# Patient Record
Sex: MALE | Race: BLACK OR AFRICAN AMERICAN | Employment: UNEMPLOYED | URBAN - METROPOLITAN AREA
[De-identification: names, ages, dates, MRNs, and addresses within clinical notes are randomized per-mention and may not be internally consistent; named-entity substitution may affect disease eponyms.]

---

## 2020-04-23 ENCOUNTER — HOSPITAL ENCOUNTER (EMERGENCY)
Age: 40
Discharge: HOME | End: 2020-04-23
Attending: EMERGENCY MEDICINE
Payer: MEDICAID

## 2020-04-23 VITALS
OXYGEN SATURATION: 100 % | TEMPERATURE: 97.6 F | HEART RATE: 61 BPM | RESPIRATION RATE: 16 BRPM | DIASTOLIC BLOOD PRESSURE: 53 MMHG | SYSTOLIC BLOOD PRESSURE: 105 MMHG

## 2020-04-23 DIAGNOSIS — F41.1 ANXIETY STATE: ICD-10-CM

## 2020-04-23 DIAGNOSIS — R44.0 HEARING VOICES: Primary | ICD-10-CM

## 2020-04-23 PROCEDURE — 90791 PSYCH DIAGNOSTIC EVALUATION: CPT

## 2020-04-23 PROCEDURE — 99282 EMERGENCY DEPT VISIT SF MDM: CPT

## 2020-04-23 NOTE — ED NOTES
Patient given discharge instructions and follow up information.  Pt ambulatory out of ER with steady gait

## 2020-04-23 NOTE — BSMART NOTE
Comprehensive Assessment Form Part 1 Section I - Disposition Axis I - Bipolar (per pt report) Axis II - deferred Axis III - History reviewed. No pertinent past medical history. Axis IV - socioeconomic, homeless, lack of support The Medical Doctor to Psychiatrist conference was not completed. The Medical Doctor is in agreement with Psychiatrist disposition because of patient not meeting inpatient criteria. The plan is discharge with outpatient resources. The on-call Psychiatrist consulted was Dr. Tano Pineda. The admitting Psychiatrist will be Dr. Tano Pineda. The admitting Diagnosis is N/A. The Payor source is self pay. Section II - Integrated Summary Summary:  Patient arrived to ER with chief complain being per triage: \"Pt c/o anxiety, denies HI, denies SI, denies cough, sob or fever. \"  Patient denied suicidal ideation and homicidal ideation. Patient did not share he was feeling anxious but did reveal he is hearing voices and needed a new prescription for his Risperdal. Patient denied voices being command in nature. Patient shared he is from [place] and has stopped in [place] for two weeks on his journey to [place]. Patient shared he used to live in [place] and he decided to go back there to reside. Patient reported his sleep and appetite are \"okay. \" Patient reported no source of income but stated he would like to get on disability. Patient reported he is in need to get his Risperdal prescription refilled. Patient also reported his last hospitalization was in [place] back in 2010. Patient was poor historian. Patient provided writer with step-fathers name/contact telephone number, Jonathan/[phone]. Writer called and Antonia Bustillo shared patient was on his way to [place] and they did not know he was still here in [place]. Antonia Bustillo was concerned patient may have Covid 23 and writer shared patient did not report those symptoms.  Writer shared disposition plan with Antonia Bustillo and he was appreciative of the referral resources for patient. Patient at this time does not meet criteria for inpatient or CSU admission. Patient provided with Covid 19 Call sheet for shelter and food resources. Patient also provided with [facility] information as well as [facility] and other [facility] clinics. The patienthas demonstrated mental capacity to provide informed consent. The information is given by the patient. The Chief Complaint is anxiety & medication refill. The Precipitant Factors are homelessness, lack of structure & lack of resources. Previous Hospitalizations: yes The patient has not previously been in restraints. Current Psychiatrist and/or  is no one. Lethality Assessment: 
 
The potential for suicide is not noted. The potential for homicide is not noted. The patient has not been a perpetrator of sexual or physical abuse. There are not pending charges. The patient is not felt to be at risk for self harm or harm to others. The attending nurse was advised that security has not been notified. Section III - Psychosocial 
The patient's overall mood and attitude is sleepy. Feelings of helplessness and hopelessness are not observed. Generalized anxiety is not observed. Panic is not observed. Phobias are not observed. Obsessive compulsive tendencies are not observed. Section IV - Mental Status Exam 
The patient's appearance shows poor hygiene. The patient's behavior shows no evidence of impairment. The patient is oriented to time, place, person and situation. The patient's speech is soft. The patient's mood is withdrawn. The range of affect is flat. The patient's thought content demonstrates no evidence of impairment. The thought process shows no evidence of impairment. The patient's perception shows evidence of auditory hallucination impairment.  The patient's memory shows no evidence of impairment. The patient's appetite shows no evidence of impairment. The patient's sleep shows no evidence of impairment. The patient's insight shows no evidence of impairment. The patient's judgement shows no evidence of impairment. Section V - Substance Abuse The patient is not using substances. Section VI - Living Arrangements The patient is single. The patient lives alone. The patient has no children. The patient does plan to return home upon discharge. The patient does not have legal issues pending. The patient's source of income comes from no one. Sabianism and cultural practices have not been voiced at this time. The patient's greatest support comes from no one and this person will not be involved with the treatment. The patient has not been in an event described as horrible or outside the realm of ordinary life experience either currently or in the past. 
The patient has not been a victim of sexual/physical abuse. Section VII - Other Areas of Clinical Concern The highest grade achieved is some college with the overall quality of school experience being described as unkown. The patient is currently unemployed and speaks [place] as a primary language. The patient has no communication impairments affecting communication. The patient's preference for learning can be described as: can read and write adequately. The patient's hearing is normal.  The patient's vision is normal. 
 
 
Chantel Sun M.S., Resident In Counseling

## 2020-04-23 NOTE — ED NOTES
Spoke with Zulema from [facility] she is working with [facility] to find placement for pt.

## 2020-04-23 NOTE — DISCHARGE INSTRUCTIONS
Patient Education        Anxiety Disorder: Care Instructions  Your Care Instructions    Anxiety is a normal reaction to stress. Difficult situations can cause you to have symptoms such as sweaty palms and a nervous feeling. In an anxiety disorder, the symptoms are far more severe. Constant worry, muscle tension, trouble sleeping, nausea and diarrhea, and other symptoms can make normal daily activities difficult or impossible. These symptoms may occur for no reason, and they can affect your work, school, or social life. Medicines, counseling, and self-care can all help. Follow-up care is a key part of your treatment and safety. Be sure to make and go to all appointments, and call your doctor if you are having problems. It's also a good idea to know your test results and keep a list of the medicines you take. How can you care for yourself at home? · Take medicines exactly as directed. Call your doctor if you think you are having a problem with your medicine. · Go to your counseling sessions and follow-up appointments. · Recognize and accept your anxiety. Then, when you are in a situation that makes you anxious, say to yourself, \"This is not an emergency. I feel uncomfortable, but I am not in danger. I can keep going even if I feel anxious. \"  · Be kind to your body:  ? Relieve tension with exercise or a massage. ? Get enough rest.  ? Avoid alcohol, caffeine, nicotine, and illegal drugs. They can increase your anxiety level and cause sleep problems. ? Learn and do relaxation techniques. See below for more about these techniques. · Engage your mind. Get out and do something you enjoy. Go to a funny movie, or take a walk or hike. Plan your day. Having too much or too little to do can make you anxious. · Keep a record of your symptoms. Discuss your fears with a good friend or family member, or join a support group for people with similar problems. Talking to others sometimes relieves stress.   · Get involved in social groups, or volunteer to help others. Being alone sometimes makes things seem worse than they are. · Get at least 30 minutes of exercise on most days of the week to relieve stress. Walking is a good choice. You also may want to do other activities, such as running, swimming, cycling, or playing tennis or team sports. Relaxation techniques  Do relaxation exercises 10 to 20 minutes a day. You can play soothing, relaxing music while you do them, if you wish. · Tell others in your house that you are going to do your relaxation exercises. Ask them not to disturb you. · Find a comfortable place, away from all distractions and noise. · Lie down on your back, or sit with your back straight. · Focus on your breathing. Make it slow and steady. · Breathe in through your nose. Breathe out through either your nose or mouth. · Breathe deeply, filling up the area between your navel and your rib cage. Breathe so that your belly goes up and down. · Do not hold your breath. · Breathe like this for 5 to 10 minutes. Notice the feeling of calmness throughout your whole body. As you continue to breathe slowly and deeply, relax by doing the following for another 5 to 10 minutes:  · Tighten and relax each muscle group in your body. You can begin at your toes and work your way up to your head. · Imagine your muscle groups relaxing and becoming heavy. · Empty your mind of all thoughts. · Let yourself relax more and more deeply. · Become aware of the state of calmness that surrounds you. · When your relaxation time is over, you can bring yourself back to alertness by moving your fingers and toes and then your hands and feet and then stretching and moving your entire body. Sometimes people fall asleep during relaxation, but they usually wake up shortly afterward. · Always give yourself time to return to full alertness before you drive a car or do anything that might cause an accident if you are not fully alert.  Never play a relaxation tape while you drive a car. When should you call for help? Call 911 anytime you think you may need emergency care. For example, call if:    · You feel you cannot stop from hurting yourself or someone else.   [place] the numbers for these national suicide hotlines: 2-400-439-TALK (6-208.959.8875) and 9-281-MCZCQBJ (6-456.325.4703). If you or someone you know talks about suicide or feeling hopeless, get help right away.   Watch closely for changes in your health, and be sure to contact your doctor if:    · You have anxiety or fear that affects your life.     · You have symptoms of anxiety that are new or different from those you had before. Where can you learn more? Go to http://yayaAvtodoriaelly.info/  Enter P754 in the search box to learn more about \"Anxiety Disorder: Care Instructions. \"  Current as of: May 28, 2019Content Version: 12.4  © 4311-3732 Healthwise, Incorporated. Care instructions adapted under license by [employer] (which disclaims liability or warranty for this information). If you have questions about a medical condition or this instruction, always ask your healthcare professional. [place] [address] any warranty or liability for your use of this information. Patient Education        Learning About Schizoaffective Disorder  What is schizoaffective disorder? Schizoaffective (say \"dkga-mt-cs-FECK-tiv\") disorder is a complex mental illness. People who have it have the symptoms of both schizophrenia and a mood disorder. The disorder affects how clearly you can think. It can also make it hard to manage your emotions and connect with others. And it affects how happy or sad you feel. What causes it? Experts don't know what causes schizoaffective disorder. It may have different causes for different people. It's not caused by anything you did or how your parents raised you. And it's not a sign of weakness. What are the symptoms?   The symptoms of schizoaffective disorder are the same as those of schizophrenia and a mood disorder. People with schizoaffective disorder may have many of these symptoms. Schizophrenia symptoms include:  · Having hallucinations. This means that you see or hear things that aren't really there. · Having delusions. These are beliefs that aren't real.  · Having a hard time feeling and showing emotion. Mood disorder symptoms include:  · Depression. · Feeling extremely happy or having lots of energy. How is it diagnosed? Your doctor or mental health professional usually can tell if you have schizoaffective disorder by talking with you. He or she will look at the order and timing of your symptoms and how long your symptoms last.  Your doctor will ask you about other things too. These may include questions about:  · Any odd experiences you may have had, such as hearing voices or having confusing thoughts. · Your feelings. · Any changes in eating habits, energy level, and interest in daily tasks. · How well you are sleeping. · If you can focus on the things you do. How is it treated? Finding out that you have schizoaffective disorder can be scary and hard to deal with. But the disorder can be treated. The goal of treatment is to lower your stress and help your brain work as it should. Ongoing treatment can keep the disorder under control. Treatment includes medicines and counseling. Medicines help your symptoms. It's important to take your medicines on schedule to keep your moods even. When you feel good, you may think that you don't need them. But it is important to keep taking them. Counseling helps you change how you think about things. It can also help you cope with the illness. You will work with a mental health professional. This may be a psychologist, a licensed professional counselor, a clinical , or a psychiatrist.  Follow-up care is a key part of your treatment and safety.  Be sure to make and go to all appointments, and call your doctor if you are having problems. It's also a good idea to know your test results and keep a list of the medicines you take. Where can you learn more? Go to http://yaya-elly.info/  Enter A016 in the search box to learn more about \"Learning About Schizoaffective Disorder. \"  Current as of: May 28, 2019Content Version: 12.4  © 2871-9058 Healthwise, Incorporated. Care instructions adapted under license by [employer] (which disclaims liability or warranty for this information). If you have questions about a medical condition or this instruction, always ask your healthcare professional. [address] any warranty or liability for your use of this information.

## 2020-04-23 NOTE — ED NOTES
Pt \"seeking psychiatric care\".    C/o of \"biploar disorder and anxiety\"   +Hallucinations (audtiory)   +SI (but \"not more than usual\"  -HI     Previously taken Risperdal but unable to obtain at this time

## 2020-04-23 NOTE — ED PROVIDER NOTES
HPI   Patient is a 56-year-old black male who presents the ED requesting psychiatric evaluation. He states he moved here from [place] and has been out of his meds. He usually takes Risperdal.Denies fever, cold symptoms, headache, neck pain, visual changes, focal weakness or rash. Denies any difficulty breathing, difficulty swallowing, SOB or chest pain. Denies any nausea, vomiting or diarrhea. Pt. Reports that he has not had any food or medications today prior to arrival.  He denies any suicidal or homicidal ideations. History reviewed. No pertinent past medical history. History reviewed. No pertinent surgical history. History reviewed. No pertinent family history.     Social History     Socioeconomic History    Marital status: Not on file     Spouse name: Not on file    Number of children: Not on file    Years of education: Not on file    Highest education level: Not on file   Occupational History    Not on file   Social Needs    Financial resource strain: Not on file    Food insecurity     Worry: Not on file     Inability: Not on file    Transportation needs     Medical: Not on file     Non-medical: Not on file   Tobacco Use    Smoking status: Not on file   Substance and Sexual Activity    Alcohol use: Not on file    Drug use: Not on file    Sexual activity: Not on file   Lifestyle    Physical activity     Days per week: Not on file     Minutes per session: Not on file    Stress: Not on file   Relationships    Social connections     Talks on phone: Not on file     Gets together: Not on file     Attends Jain service: Not on file     Active member of club or organization: Not on file     Attends meetings of clubs or organizations: Not on file     Relationship status: Not on file    Intimate partner violence     Fear of current or ex partner: Not on file     Emotionally abused: Not on file     Physically abused: Not on file     Forced sexual activity: Not on file   Other Topics Concern    Not on file   Social History Narrative    Not on file         ALLERGIES: Patient has no known allergies. Review of Systems   Constitutional: Negative for activity change, appetite change, fever and unexpected weight change. HENT: Negative for congestion, rhinorrhea and trouble swallowing. Eyes: Negative for visual disturbance. Respiratory: Negative for cough and shortness of breath. Cardiovascular: Negative for chest pain, palpitations and leg swelling. Gastrointestinal: Negative for abdominal pain, diarrhea and vomiting. Genitourinary: Negative for dysuria. Musculoskeletal: Negative for arthralgias and myalgias. Skin: Negative for rash. Neurological: Negative for dizziness, light-headedness and headaches. Psychiatric/Behavioral: Negative for suicidal ideas. The patient is nervous/anxious. All other systems reviewed and are negative. Vitals:    04/23/20 0850   BP: 105/53   Pulse: 61   Resp: 16   Temp: 97.6 °F (36.4 °C)   SpO2: 100%            Physical Exam  Vitals signs and nursing note reviewed. Constitutional:       General: He is not in acute distress. Appearance: Normal appearance. He is normal weight. He is not ill-appearing, toxic-appearing or diaphoretic. Comments: Black male; smoker   HENT:      Head: Normocephalic. Right Ear: Tympanic membrane normal.      Left Ear: Tympanic membrane normal.      Nose: Nose normal.      Mouth/Throat:      Mouth: Mucous membranes are moist.      Pharynx: No posterior oropharyngeal erythema. Neck:      Musculoskeletal: Normal range of motion. Cardiovascular:      Rate and Rhythm: Normal rate and regular rhythm. Pulmonary:      Effort: Pulmonary effort is normal.      Breath sounds: Normal breath sounds. Musculoskeletal: Normal range of motion. Lymphadenopathy:      Cervical: No cervical adenopathy. Skin:     General: Skin is warm and dry. Findings: No rash.    Neurological:      General: No focal deficit present. Mental Status: He is alert and oriented to person, place, and time. MDM       Procedures    Bsmart consult and evaluation. She gave him referral information for close outpatient follow up. Pt has been re-examined. 10:27 AM  Patient's plan of care have been reviewed with him. Patient has verbally conveyed his understanding and agreement of his signs, symptoms, diagnosis, treatment and prognosis and additionally agrees to follow up as recommended or return to the Emergency Room should his condition change prior to follow-up. Discharge instructions have also been provided to the patient with some educational information regarding his diagnosis as well a list of reasons why he would want to return to the ER prior to his follow-up appointment should his condition change. Adamaris Maya NP